# Patient Record
Sex: MALE | Race: WHITE
[De-identification: names, ages, dates, MRNs, and addresses within clinical notes are randomized per-mention and may not be internally consistent; named-entity substitution may affect disease eponyms.]

---

## 2020-10-23 ENCOUNTER — HOSPITAL ENCOUNTER (EMERGENCY)
Dept: HOSPITAL 54 - ER | Age: 60
Discharge: HOME | End: 2020-10-23
Payer: MEDICAID

## 2020-10-23 VITALS — BODY MASS INDEX: 24.25 KG/M2 | HEIGHT: 68 IN | WEIGHT: 160 LBS

## 2020-10-23 VITALS — SYSTOLIC BLOOD PRESSURE: 144 MMHG | DIASTOLIC BLOOD PRESSURE: 89 MMHG

## 2020-10-23 DIAGNOSIS — I10: ICD-10-CM

## 2020-10-23 DIAGNOSIS — Z59.0: ICD-10-CM

## 2020-10-23 DIAGNOSIS — M79.672: Primary | ICD-10-CM

## 2020-10-23 SDOH — ECONOMIC STABILITY - HOUSING INSECURITY: HOMELESSNESS: Z59.0

## 2020-10-23 NOTE — NUR
PT AAOX4. [id number] C/O LT LEG PAIN, - TRAUMA. PT WAS SEEN @ [facility] EARLY 
TODAY. PT PLACED IN BED 11 ON MONITOR AND PULSE OX. VSS. NO ACUTE DISTRESS 
NOTED. MD AT BEDSIDE FOR EVAL. AWAITING ORDERS.

## 2020-10-23 NOTE — NUR
Patient discharged to home in stable condition. Written and verbal after care 
instructions given. Patient verbalizes understanding of instruction and RX. Pt 
ambulated with steady gait./ vss. denies pain.